# Patient Record
Sex: MALE | Race: WHITE
[De-identification: names, ages, dates, MRNs, and addresses within clinical notes are randomized per-mention and may not be internally consistent; named-entity substitution may affect disease eponyms.]

---

## 2019-04-06 ENCOUNTER — HOSPITAL ENCOUNTER (EMERGENCY)
Dept: HOSPITAL 56 - MW.ED | Age: 29
Discharge: HOME | End: 2019-04-06
Payer: COMMERCIAL

## 2019-04-06 DIAGNOSIS — F17.210: ICD-10-CM

## 2019-04-06 DIAGNOSIS — N48.89: Primary | ICD-10-CM

## 2019-04-06 NOTE — EDM.PDOC
ED HPI GENERAL MEDICAL PROBLEM





- General


Chief Complaint: Genitourinary Problem


Stated Complaint: PT HAS LOWER PELVIC PAIN


Time Seen by Provider: 04/06/19 20:28





- History of Present Illness


INITIAL COMMENTS - FREE TEXT/NARRATIVE: 





HISTORY AND PHYSICAL:





History of present illness:


The patient is a healthy 29-year-old male with no significant past medical 

history and no  history who presents with a one-week history of HEENT at the 

tip of his penis. The patient says that when he wakes up in the morning he has 

no discomfort but as the day progresses and into the early evening he starts 

experiencing the discomfort. He says it's a sharp burning pain only at the tip 

of his penis and is not necessarily occurring only when he urinates. His urine 

has been normal without any cloudiness hematuria or smell any he says he has no 

STD risks. He has no testicular pain or swelling no pelvic pain or lower 

abdominal pain and no flank pain. The patient admits that the pain is worse in 

the evening and is nonexistent when he wakes up in the morning. He has not 

noticed any sores redness or lesions in the area. He has no urgency no 

difficulty initiating a stream and no sensations of incomplete voiding. He has 

no midline back pain and no groin swelling or skin changes. He has never had an 

STD in the past. He says that intermittently he will feel sharp pain and other 

times it just feels tingly. He has not noticed any redness. The patient does 

admit that he does not drink much water and he mostly drinks caffeine and 

whiskey.


The patient is eating and drinking normally and has no other systemic issues


Review of systems: 


As per history of present illness and below otherwise all systems reviewed and 

negative.





Past medical history: 


As per history of present illness and as reviewed below otherwise 

noncontributory.





Surgical history: 


As per history of present illness and as reviewed below otherwise 

noncontributory.





Social history: 


No reported history of drug or alcohol abuse.





Family history: 


As per history of present illness and as reviewed below otherwise 

noncontributory.





Physical exam:


General: Well-developed well-nourished mildly overweight man who is nontoxic 

and vital signs are noted by me


HEENT: Atraumatic, normocephalic,  negative for conjunctival pallor or scleral 

icterus, mucous membranes moist, throat clear, neck supple, nontender, trachea 

midline.


Lungs: Clear to auscultation, breath sounds equal bilaterally, chest nontender.


Heart: S1S2, regular rate and rhythm no overt murmurs


Abdomen: Soft, nondistended, nontender. NABS Negative for costovertebral 

tenderness.


Pelvis: Stable nontender.


Genitourinary: Testicles are distended bilaterally and there is no evidence of 

any swelling or tenderness with palpation and no masses. Cremasteric reflex is 

intact. There is no inguinal lymphadenopathy and no skin changes or lesions 

appreciated. At the penile head there is no evidence of any swelling tenderness 

redness or lesions appreciated and the urethral meatus has no blood or drainage 

appreciated. On palpation there is no tenderness of this area and I cannot 

reproduce the pain.


Rectal: Deferred.


Extremities: Atraumatic, full range of motion Neurovascular unremarkable.


Neuro: Awake, alert, oriented. Cranial nerves II through XII unremarkable. 

Cerebellum unremarkable. Motor and sensory unremarkable throughout. Exam 

nonfocal.





Diagnostics:


UA with reflux, urine for GC and Chlamydia





Therapeutics:


[]





Impression: 


Penile pain





Definitive disposition and diagnosis as appropriate pending reevaluation and 

review of above.


  ** penis


Pain Score (Numeric/FACES): 3





- Related Data


 Allergies











Allergy/AdvReac Type Severity Reaction Status Date / Time


 


No Known Allergies Allergy   Verified 04/06/19 20:28











Home Meds: 


 Home Meds





. [No Known Home Meds]  04/06/19 [History]











Past Medical History





- Past Health History


Medical/Surgical History: Denies Medical/Surgical History





Social & Family History





- Family History


Family Medical History: Noncontributory





- Tobacco Use


Smoking Status *Q: Current Every Day Smoker


Years of Tobacco use: 6


Packs/Tins Daily: 1





- Recreational Drug Use


Recreational Drug Use: Yes


Drug Use in Last 12 Months: Yes


Recreational Drug Type: Reports: Marijuana/Hashish





ED ROS GENERAL





- Review of Systems


Review Of Systems: ROS reveals no pertinent complaints other than HPI.





ED EXAM, GENERAL





- Physical Exam


Exam: See Below (See dictation)





Course





- Vital Signs


Last Recorded V/S: 


 Last Vital Signs











Temp  37.3 C   04/06/19 20:20


 


Pulse  95   04/06/19 20:20


 


Resp  18   04/06/19 20:20


 


BP  173/109 H  04/06/19 20:20


 


Pulse Ox  95   04/06/19 20:20














- Orders/Labs/Meds


Orders: 


 Active Orders 24 hr











 Category Date Time Status


 


 CHLAMYDIA AND GONORRHEA BY [facility]  04/06/19 20:39 Received











Labs: 


 Laboratory Tests











  04/06/19 Range/Units





  20:39 


 


Urine Color  YELLOW  


 


Urine Appearance  CLEAR  


 


Urine pH  6.0  (5.0-8.0)  


 


Ur Specific Gravity  1.010  (1.001-1.035)  


 


Urine Protein  NEGATIVE  (NEGATIVE)  mg/dL


 


Urine Glucose (UA)  NEGATIVE  (NEGATIVE)  mg/dL


 


Urine Ketones  NEGATIVE  (NEGATIVE)  mg/dL


 


Urine Occult Blood  NEGATIVE  (NEGATIVE)  


 


Urine Nitrite  NEGATIVE  (NEGATIVE)  


 


Urine Bilirubin  NEGATIVE  (NEGATIVE)  


 


Urine Urobilinogen  0.2  (<2.0)  EU/dL


 


Ur Leukocyte Esterase  NEGATIVE  (NEGATIVE)  














Departure





- Departure


Time of Disposition: 21:04


Disposition: Home, Self-Care 01


Condition: Good


Clinical Impression: 


 Penis pain








- Discharge Information


Referrals: 


PCP,None [Primary Care Provider] - 


Forms:  ED Department Discharge


Additional Instructions: 


The following information is given to patients seen in the emergency department 

who are being discharged to home. This information is to outline your options 

for follow-up care. We provide all patients seen in our emergency department 

with a follow-up referral.





The need for follow-up, as well as the timing and circumstances, are variable 

depending upon the specifics of your emergency department visit.





If you don't have a primary care physician on staff, we will provide you with a 

referral. We always advise you to contact your personal physician following an 

emergency department visit to inform them of the circumstance of the visit and 

for follow-up with them and/or the need for any referrals to a consulting 

specialist.





The emergency department will also refer you to a specialist when appropriate. 

This referral assures that you have the opportunity for followup care with a 

specialist. All of these measure are taken in an effort to provide you with 

optimal care, which includes your followup.





Under all circumstances we always encourage you to contact your private 

physician who remains a resource for coordinating  your care. When calling for 

followup care, please make the office aware that this follow-up is from your 

recent emergency room visit. If for any reason you are refused follow-up, 

please contact the [facility] emergency 

department at [phone] and ask to speak to the emergency department 

charge nurse.





[facility]


Specialty Care-Urology


[address] [place] [id number]


[phone]








Please call and schedule a follow-up appointment with our urologist for further 

care and evaluation and return to ER as needed and as discussed. Please try to 

push more hydration and reduce your caffeine intake.





- My Orders


Last 24 Hours: 


My Active Orders





04/06/19 20:39


CHLAMYDIA AND GONORRHEA BY [facility] 














- Assessment/Plan


Last 24 Hours: 


My Active Orders





04/06/19 20:39


CHLAMYDIA AND GONORRHEA BY [facility]